# Patient Record
Sex: MALE | URBAN - METROPOLITAN AREA
[De-identification: names, ages, dates, MRNs, and addresses within clinical notes are randomized per-mention and may not be internally consistent; named-entity substitution may affect disease eponyms.]

---

## 2024-10-28 ENCOUNTER — TELEPHONE (OUTPATIENT)
Dept: NEUROLOGY | Facility: CLINIC | Age: 74
End: 2024-10-28

## 2025-01-31 ENCOUNTER — OFFICE VISIT (OUTPATIENT)
Facility: CLINIC | Age: 75
End: 2025-01-31
Payer: MEDICARE

## 2025-01-31 VITALS
WEIGHT: 151.81 LBS | DIASTOLIC BLOOD PRESSURE: 67 MMHG | SYSTOLIC BLOOD PRESSURE: 142 MMHG | HEART RATE: 78 BPM | HEIGHT: 68 IN | BODY MASS INDEX: 23.01 KG/M2

## 2025-01-31 DIAGNOSIS — G32.81 CEREBELLAR ATAXIA DUE TO ALCOHOL: Primary | ICD-10-CM

## 2025-01-31 DIAGNOSIS — F10.20 CEREBELLAR ATAXIA DUE TO ALCOHOL: Primary | ICD-10-CM

## 2025-01-31 PROCEDURE — 99203 OFFICE O/P NEW LOW 30 MIN: CPT | Mod: S$PBB,,, | Performed by: PSYCHIATRY & NEUROLOGY

## 2025-01-31 PROCEDURE — 99999 PR PBB SHADOW E&M-EST. PATIENT-LVL II: CPT | Mod: PBBFAC,,, | Performed by: PSYCHIATRY & NEUROLOGY

## 2025-01-31 PROCEDURE — 99212 OFFICE O/P EST SF 10 MIN: CPT | Mod: PBBFAC | Performed by: PSYCHIATRY & NEUROLOGY

## 2025-01-31 RX ORDER — ATORVASTATIN CALCIUM 20 MG/1
20 TABLET, FILM COATED ORAL DAILY
COMMUNITY
Start: 2024-12-20

## 2025-01-31 RX ORDER — TESTOSTERONE GEL, 1% 10 MG/G
2-3 GEL TRANSDERMAL DAILY
COMMUNITY
Start: 2025-01-09

## 2025-01-31 RX ORDER — LISINOPRIL 40 MG/1
40 TABLET ORAL DAILY
COMMUNITY

## 2025-01-31 RX ORDER — FAMOTIDINE 20 MG/1
20 TABLET, FILM COATED ORAL DAILY
COMMUNITY

## 2025-01-31 RX ORDER — ALLOPURINOL 300 MG/1
300 TABLET ORAL DAILY
COMMUNITY

## 2025-01-31 NOTE — PROGRESS NOTES
"MOVEMENT DISORDERS CLINIC NEW CONSULT NOTE    PCP/Referring Provider:   Scotty Aragon III, MD  [address] [facility] [place] [id number]  Date of Service: 1/31/2025    Chief Complaint: Imbalance    HPI: Anuel Hale is a right HANDED 74 y.o. male with PMH most notable for gout, HTN, anemia , presenting for evaluation.    Started feeling off balance about 3 years ago, had an episode where he "wrenched his back." Had an L4/5 discectomy in November 2021 because he had developed a foot drop on the right leg. Foot drop improved. Balance got better but then it significantly worsened in the last two years. Notices more issues with balance as the day goes on, more troubles later in the day.     He had MRIs of his spine in 2024 which did show severe canal stenosis but he saw NSGY who did not feel that this would explain his symptoms.     The neurosurgeon was concerned bbout NPH and did a large volume tap in November 2024 but he had no symptomatic improvement.     He states he has fallen. Last fall about 5 months ago. He has no done any balance physical therapy but he does go to the gym daily.       Current Medications:  Outpatient Encounter Medications as of 1/31/2025   Medication Sig Dispense Refill    allopurinoL (ZYLOPRIM) 300 MG tablet Take 300 mg by mouth once daily.      atorvastatin (LIPITOR) 20 MG tablet Take 20 mg by mouth once daily.      famotidine (PEPCID) 20 MG tablet Take 20 mg by mouth Daily.      lisinopriL (PRINIVIL,ZESTRIL) 40 MG tablet Take 40 mg by mouth once daily.      testosterone (ANDROGEL) 1 % (50 mg/5 gram) GlPk Apply 2-3 drops topically once daily.       No facility-administered encounter medications on file as of 1/31/2025.       Past Medical History:  There is no problem list on file for this patient.  HTN  Gout      Past Surgical History:  No past surgical history on file.  Appendectomy  Lumbar spine surgery    Social:  Social History     Socioeconomic History    Marital " "status: Unknown     Social Drivers of Health     Financial Resource Strain: Low Risk  (10/16/2024)    Received from [facility]    Overall Financial Resource Strain (CARDIA)     Difficulty of Paying Living Expenses: Not hard at all   Food Insecurity: No Food Insecurity (10/16/2024)    Received from [facility]    Hunger Vital Sign     Worried About Running Out of Food in the Last Year: Never true     Ran Out of Food in the Last Year: Never true   Transportation Needs: No Transportation Needs (10/16/2024)    Received from [facility]    PRAPARE - Transportation     Lack of Transportation (Medical): No     Lack of Transportation (Non-Medical): No   Physical Activity: Sufficiently Active (10/16/2024)    Received from [facility]    Exercise Vital Sign     Days of Exercise per Week: 7 days     Minutes of Exercise per Session: 60 min   Stress: No Stress Concern Present (10/16/2024)    Received from [facility]    [employer] [place] [employer] - Occupational Stress Questionnaire     Feeling of Stress : Not at all   Housing Stability: Low Risk  (5/19/2024)    Received from [facility]    Housing Stability Vital Sign     Unable to Pay for Housing in the Last Year: No     Number of Places Lived in the Last Year: 1     Unstable Housing in the Last Year: No       Family History:  No family history on file.    PHYSICAL:  BP (!) 142/67 (BP Location: Right arm, Patient Position: Sitting)   Pulse 78   Ht 5' 8" (1.727 m)   Wt 68.9 kg (151 lb 12.6 oz)   BMI 23.08 kg/m²     General Medical Examination:  General: Good hygiene, appropriate appearance.  HEENT: Normocephalic, atraumatic.   Neck: Supple. Chest: Unlabored breathing.   Ext: No clubbing, cyanosis, or edema.     Mental Status:  Mood/Affect: Appropriate/congruent.  Level of consciousness: Awake, alert.  Orientation: Oriented to person, place, time and situation.  Language: Normal fluency, able to name, repeat, and follow complex multi-step commands    Cranial " "nerves:  I: Not tested  II: VFF to counting  III, IV, VI: EOMI with conjugate gaze and no nystagmus on end gaze; he does have hypermetric saccades   V: Facial sensation intact and symmetric over the bilateral V1-V3  VII: Facial muscle activation intact and symmetric over the bilateral upper and lower face  VIII: Hearing intact on right, diminished on left  IX, X, XII: tongue and palate midline with symmetric movement; no atrophy or fasiculations  X: SCMs and shoulder shrug full strength b/l and symmetric    Motor:   -UE: 5/5 deltoids; 5/5 biceps, triceps; 5/5 wrist flexors, extensors; 5/5 interosseous; 5/5   -LEs: 5/5 hip flexion, extension; 5/5 knee flexion, extension; 5/5 ankle flexion, extension  Fasciculations/Atrophy: none  Tone: normal  Bradykinesia: none    DTRs:  ? Biceps Triceps Brachioradialis Knee Ankle   Left 2+ 2+ 2+ 2+ 2+   Right 2+ 2+ 2+ 2+ 2+   -Plantar reflex: down      Sensation:   -Vibration: Diminished in distal foot on right  -Proprioception: Intact and symmetric in the bilateral upper and lower extremities.    Coordination:   -Finger to nose: intacty  -Heel to shin: impaired bilaterally  MARIVEL significantly impaired in BUE    Gait:  -Arises from chair without use of hands.    Romberg: negative    Pull Test: Takes 2 steps and recovers    Laboratory Data:    No results found for: "TSH", "Q9RHXTZ", "E7EXTXW", "THYROIDAB", "FREET4"  No results found for: "LCGZENAU70"      Imaging:  No results found for this or any previous visit (from the past 2160 hours).    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 On exam has clear ataxia. Endorses periods of life when he would drink 5 drinks per day, currently 2-3.    His clinical trajectory would not suggest another reversible cause since he states he has been improving with cutting back.    PCP could consider " broadening the workup for ataxia to include: Vitamin E, anti-gliadin antibodies, anti-TPO & thyroglobulin, JOHN, anti-SSA/SSB, Syphilis.    Offered PT and discussed importance of reducing alcohol intake.     RTC PRN         I spent a total of 42 minutes on the day of the visit.  This includes face to face time and non-face to face time preparing to see the patient (eg, review of tests), obtaining and/or reviewing separately obtained history, documenting clinical information in the electronic or other health record, independently interpreting results and communicating results to the patient/family/caregiver, or care coordinator.   Gabino Denney MD  [facility]  Department of Neurology  Movement Disorders